# Patient Record
Sex: FEMALE | Race: WHITE | Employment: UNEMPLOYED | URBAN - METROPOLITAN AREA
[De-identification: names, ages, dates, MRNs, and addresses within clinical notes are randomized per-mention and may not be internally consistent; named-entity substitution may affect disease eponyms.]

---

## 2020-09-01 ENCOUNTER — THERAPY VISIT (OUTPATIENT)
Dept: PHYSICAL THERAPY | Facility: CLINIC | Age: 40
End: 2020-09-01
Payer: COMMERCIAL

## 2020-09-01 DIAGNOSIS — M25.552 HIP PAIN, LEFT: ICD-10-CM

## 2020-09-01 DIAGNOSIS — G89.29 CHRONIC PAIN OF RIGHT KNEE: ICD-10-CM

## 2020-09-01 DIAGNOSIS — M25.561 CHRONIC PAIN OF RIGHT KNEE: ICD-10-CM

## 2020-09-01 PROCEDURE — 97161 PT EVAL LOW COMPLEX 20 MIN: CPT | Mod: GP | Performed by: PHYSICAL THERAPIST

## 2020-09-01 PROCEDURE — 97110 THERAPEUTIC EXERCISES: CPT | Mod: GP | Performed by: PHYSICAL THERAPIST

## 2020-09-01 ASSESSMENT — ACTIVITIES OF DAILY LIVING (ADL)
WALK: ACTIVITY IS NOT DIFFICULT
HOS_ADL_COUNT: 16
WEAKNESS: THE SYMPTOM AFFECTS MY ACTIVITY SLIGHTLY
RISE FROM A CHAIR: ACTIVITY IS NOT DIFFICULT
HOS_ADL_ITEM_SCORE_TOTAL: 52
ROLLING_OVER_IN_BED: NO DIFFICULTY AT ALL
HEAVY_WORK: MODERATE DIFFICULTY
STIFFNESS: THE SYMPTOM AFFECTS MY ACTIVITY MODERATELY
WALKING_15_MINUTES_OR_GREATER: SLIGHT DIFFICULTY
DEEP_SQUATTING: EXTREME DIFFICULTY
HOS_ADL_SCORE(%): 81.25
HOW_WOULD_YOU_RATE_YOUR_CURRENT_LEVEL_OF_FUNCTION_DURING_YOUR_USUAL_ACTIVITIES_OF_DAILY_LIVING_FROM_0_TO_100_WITH_100_BEING_YOUR_LEVEL_OF_FUNCTION_PRIOR_TO_YOUR_HIP_PROBLEM_AND_0_BEING_THE_INABILITY_TO_PERFORM_ANY_OF_YOUR_USUAL_DAILY_ACTIVITIES?: 75
GETTING_INTO_AND_OUT_OF_AN_AVERAGE_CAR: NO DIFFICULTY AT ALL
STANDING_FOR_15_MINUTES: NO DIFFICULTY AT ALL
STEPPING_UP_AND_DOWN_CURBS: NO DIFFICULTY AT ALL
GIVING WAY, BUCKLING OR SHIFTING OF KNEE: I DO NOT HAVE THE SYMPTOM
GO UP STAIRS: ACTIVITY IS MINIMALLY DIFFICULT
GOING_DOWN_1_FLIGHT_OF_STAIRS: NO DIFFICULTY AT ALL
HOW_WOULD_YOU_RATE_THE_CURRENT_FUNCTION_OF_YOUR_KNEE_DURING_YOUR_USUAL_DAILY_ACTIVITIES_ON_A_SCALE_FROM_0_TO_100_WITH_100_BEING_YOUR_LEVEL_OF_KNEE_FUNCTION_PRIOR_TO_YOUR_INJURY_AND_0_BEING_THE_INABILITY_TO_PERFORM_ANY_OF_YOUR_USUAL_DAILY_ACTIVITIES?: 85
SQUAT: ACTIVITY IS SOMEWHAT DIFFICULT
WALKING_APPROXIMATELY_10_MINUTES: NO DIFFICULTY AT ALL
PUTTING_ON_SOCKS_AND_SHOES: SLIGHT DIFFICULTY
PAIN: THE SYMPTOM AFFECTS MY ACTIVITY MODERATELY
WALKING_DOWN_STEEP_HILLS: SLIGHT DIFFICULTY
KNEE_ACTIVITY_OF_DAILY_LIVING_SUM: 55
RECREATIONAL_ACTIVITIES: SLIGHT DIFFICULTY
GO DOWN STAIRS: ACTIVITY IS MINIMALLY DIFFICULT
SITTING_FOR_15_MINUTES: SLIGHT DIFFICULTY
GOING_UP_1_FLIGHT_OF_STAIRS: NO DIFFICULTY AT ALL
KNEEL ON THE FRONT OF YOUR KNEE: ACTIVITY IS MINIMALLY DIFFICULT
WALKING_INITIALLY: SLIGHT DIFFICULTY
SIT WITH YOUR KNEE BENT: ACTIVITY IS NOT DIFFICULT
TWISTING/PIVOTING_ON_INVOLVED_LEG: MODERATE DIFFICULTY
HOW_WOULD_YOU_RATE_THE_OVERALL_FUNCTION_OF_YOUR_KNEE_DURING_YOUR_USUAL_DAILY_ACTIVITIES?: NEARLY NORMAL
STAND: ACTIVITY IS NOT DIFFICULT
LIGHT_TO_MODERATE_WORK: NO DIFFICULTY AT ALL
SWELLING: I DO NOT HAVE THE SYMPTOM
RAW_SCORE: 55
WALKING_UP_STEEP_HILLS: SLIGHT DIFFICULTY
AS_A_RESULT_OF_YOUR_KNEE_INJURY,_HOW_WOULD_YOU_RATE_YOUR_CURRENT_LEVEL_OF_DAILY_ACTIVITY?: NORMAL
HOS_ADL_HIGHEST_POTENTIAL_SCORE: 64
LIMPING: THE SYMPTOM AFFECTS MY ACTIVITY SLIGHTLY
KNEE_ACTIVITY_OF_DAILY_LIVING_SCORE: 78.57

## 2020-09-01 NOTE — LETTER
LINDEN PATIÑO [facility] [place]  [address]  [address]  [place] [address]  [phone]    September 3, 2020    Re: Cassandra B Stransky   :   1980  MRN:  [id number]   REFERRING PHYSICIAN:   Jeannette ALTAMIRANOM [place] [facility] [place] [facility]    Date of Initial Evaluation: 20  Visits:  Rxs Used: 1  Reason for Referral:     Hip pain, left  Chronic pain of right knee    EVALUATION SUMMARY    [facility] Initial Evaluation  Subjective:  The history is provided by the patient. No  was used.   Therapist Generated HPI Evaluation  Problem details: Patient reports history of left hip and right knee pain beginning 8-9 months ago with no specific precipitating event at onset. She reports that she typically does stairs a lot at work and thought this may have caused the pain to increase, but has been off work since March and the pain hasn't improved since then.         Type of problem:  Left hip.    This is a chronic condition.  Condition occurred with:  Insidious onset.  Patient reports pain:  Groin, lateral and posterior.  Pain radiates to:  No radiation.   Associated symptoms:  Loss of motion/stiffness. Symptoms are exacerbated by walking, ascending stairs and descending stairs  and relieved by rest.    Therapist Generated HPI Evaluation         Type of problem:  Right knee.  This is a chronic condition.  Condition occurred with:  Insidious onset.  Patient reports pain:  Medial.    Associated symptoms:  Loss of strength. Symptoms are exacerbated by kneeling (getting up from kneeling)  and relieved by rest.  Patient Health History  Cassandra B Stransky being seen for left hip & right knee pain.     Date of Onset: 8-9 months ago.   Problem occurred: unknown cause   Pain is reported as 5/10 on pain scale.  Cassandra B Stransky   :   1980        General health as reported by patient is good.  Pertinent medical history includes: overweight and smoking.   Red flags:  None as reported  by patient.  Medical allergies: other. Other medical allergies details: Penicillin.   Surgeries include:  None.    Current medications:  None.    Current occupation is [employer].   Primary job tasks include:  Lifting/carrying, pushing/pulling, prolonged standing and repetitive tasks.                  Objective:  System       Hip Evaluation  HIP AROM:    Flexion: Left: 105    Right:  120  Abduction: Left:  38    Right:  40  Internal Rotation: Left: 30    Right: 45  External Rotation: Left: 45    Right: 45  Hip Strength:    Flexion:   Left: 4+/5   +  Pain:  Right: 5/5   -  Pain:              Extension:  Left: 4-/5  -  Pain:Right: 4+/5    -  Pain:    Abduction:  Left: 3+/5    ++   Pain:Right: 4+/5   -   Pain:       Knee Evaluation:  ROM:    AROM    Hyperextension:  Left:  2    Right: 5  Extension:  Left: 0    Right:  0  Flexion: Left: 150    Right: 150  Strength:   Extension:  Left: 5/5    Pain:-      Right: 5-/5    Pain:-  Flexion:  Left: 5/5    Pain:-      Right: 5/5    Pain:+/-        Assessment/Plan:    Patient is a 40 year old female with left side hip and right side knee complaints.    Patient has the following significant findings with corresponding treatment plan.                Diagnosis 1:  Left hip pain  Pain -  self management, education, directional preference exercise and home program  Decreased ROM/flexibility - manual therapy, therapeutic exercise and home program  Decreased strength - therapeutic exercise, therapeutic activities and home program  Impaired muscle performance - neuro re-education and home program  Decreased function - therapeutic activities and home program  Diagnosis 2:  Right knee pain   Pain -  self management, education, directional preference exercise and home program  Decreased strength - therapeutic exercise, therapeutic activities and home program  Cassandra B Stransky   :   1980          Impaired muscle performance - neuro re-education and home  program  Decreased function - therapeutic activities and home program    Therapy Evaluation Codes:     Cumulative Therapy Evaluation is: Low complexity.    Previous and current functional limitations:  (See Goal Flow Sheet for this information)    Short term and Long term goals: (See Goal Flow Sheet for this information)   Communication ability:  Patient appears to be able to clearly communicate and understand verbal and written communication and follow directions correctly.  Treatment Explanation - The following has been discussed with the patient:   RX ordered/plan of care  Anticipated outcomes  Possible risks and side effects  This patient would benefit from PT intervention to resume normal activities.   Rehab potential is good.    Frequency:  1 X week, once daily  Duration:  for 6 weeks  Discharge Plan:  Achieve all LTG.  Independent in home treatment program.  Reach maximal therapeutic benefit.          Thank you for your referral.    INQUIRIES  Therapist: Willie Campos DPT, Cert. MDT  [facility]LAKE [facility]  [address] [address]  [place] [address]  Phone: [phone]  Fax: [phone]

## 2020-09-01 NOTE — PROGRESS NOTES
[facility] Initial Evaluation  Subjective:  The history is provided by the patient. No  was used.   Therapist Generated HPI Evaluation  Problem details: Patient reports history of left hip and right knee pain beginning 8-9 months ago with no specific precipitating event at onset. She reports that she typically does stairs a lot at work and thought this may have caused the pain to increase, but has been off work since March and the pain hasn't improved since then.         Type of problem:  Left hip.    This is a chronic condition.  Condition occurred with:  Insidious onset.    Patient reports pain:  Groin, lateral and posterior.    Pain radiates to:  No radiation.     Associated symptoms:  Loss of motion/stiffness. Symptoms are exacerbated by walking, ascending stairs and descending stairs  and relieved by rest.          Therapist Generated HPI Evaluation         Type of problem:  Right knee.    This is a chronic condition.  Condition occurred with:  Insidious onset.    Patient reports pain:  Medial.        Associated symptoms:  Loss of strength. Symptoms are exacerbated by kneeling (getting up from kneeling)  and relieved by rest.          Patient Health History  Daily [facility]CE Madelinfrancois being seen for left hip & right knee pain.     Date of Onset: 8-9 months ago.   Problem occurred: unknown cause   Pain is reported as 5/10 on pain scale.  General health as reported by patient is good.  Pertinent medical history includes: overweight and smoking.   Red flags:  None as reported by patient.  Medical allergies: other. Other medical allergies details: Penicillin.   Surgeries include:  None.    Current medications:  None.    Current occupation is [employer].   Primary job tasks include:  Lifting/carrying, pushing/pulling, prolonged standing and repetitive tasks.                                    Objective:  System                                           Hip Evaluation  HIP  AROM:    Flexion: Left: 105    Right:  120      Abduction: Left:  38    Right:  40      Internal Rotation: Left: 30    Right: 45  External Rotation: Left: 45    Right: 45        Hip Strength:    Flexion:   Left: 4+/5   +  Pain:  Right: 5/5   -  Pain:                    Extension:  Left: 4-/5  -  Pain:Right: 4+/5    -  Pain:    Abduction:  Left: 3+/5    ++   Pain:Right: 4+/5   -   Pain:                           Knee Evaluation:  ROM:    AROM    Hyperextension:  Left:  2    Right: 5  Extension:  Left: 0    Right:  0  Flexion: Left: 150    Right: 150        Strength:     Extension:  Left: 5/5    Pain:-      Right: 5-/5    Pain:-  Flexion:  Left: 5/5    Pain:-      Right: 5/5    Pain:+/-                        General     ROS    Assessment/Plan:    Patient is a 40 year old female with left side hip and right side knee complaints.    Patient has the following significant findings with corresponding treatment plan.                Diagnosis 1:  Left hip pain  Pain -  self management, education, directional preference exercise and home program  Decreased ROM/flexibility - manual therapy, therapeutic exercise and home program  Decreased strength - therapeutic exercise, therapeutic activities and home program  Impaired muscle performance - neuro re-education and home program  Decreased function - therapeutic activities and home program  Diagnosis 2:  Right knee pain   Pain -  self management, education, directional preference exercise and home program  Decreased strength - therapeutic exercise, therapeutic activities and home program  Impaired muscle performance - neuro re-education and home program  Decreased function - therapeutic activities and home program    Therapy Evaluation Codes:     Cumulative Therapy Evaluation is: Low complexity.    Previous and current functional limitations:  (See Goal Flow Sheet for this information)    Short term and Long term goals: (See Goal Flow Sheet for this information)      Communication ability:  Patient appears to be able to clearly communicate and understand verbal and written communication and follow directions correctly.  Treatment Explanation - The following has been discussed with the patient:   RX ordered/plan of care  Anticipated outcomes  Possible risks and side effects  This patient would benefit from PT intervention to resume normal activities.   Rehab potential is good.    Frequency:  1 X week, once daily  Duration:  for 6 weeks  Discharge Plan:  Achieve all LTG.  Independent in home treatment program.  Reach maximal therapeutic benefit.    Please refer to the daily flowsheet for treatment today, total treatment time and time spent performing 1:1 timed codes.

## 2020-11-27 PROBLEM — M25.561 CHRONIC PAIN OF RIGHT KNEE: Status: RESOLVED | Noted: 2020-09-01 | Resolved: 2020-11-27

## 2020-11-27 PROBLEM — G89.29 CHRONIC PAIN OF RIGHT KNEE: Status: RESOLVED | Noted: 2020-09-01 | Resolved: 2020-11-27

## 2020-11-27 PROBLEM — M25.552 HIP PAIN, LEFT: Status: RESOLVED | Noted: 2020-09-01 | Resolved: 2020-11-27

## 2020-11-27 NOTE — PROGRESS NOTES
Patient did not return to PT following initial evaluation on 9/1/20. Please let evaluation information serve as discharge information.

## 2021-05-29 ENCOUNTER — RECORDS - HEALTHEAST (OUTPATIENT)
Dept: ADMINISTRATIVE | Facility: CLINIC | Age: 41
End: 2021-05-29

## 2022-06-15 ENCOUNTER — NURSE TRIAGE (OUTPATIENT)
Dept: NURSING | Facility: CLINIC | Age: 42
End: 2022-06-15
Payer: COMMERCIAL

## 2022-06-16 NOTE — TELEPHONE ENCOUNTER
Nurse Triage    Is this a 2nd Level Triage? NO    Situation: Patient calling with concerns for a headache.     Background: Pt reports she has had an ongoing headache since 6/11/2022. She has been taking Excedrin for the pain and this helps a little bit.     Assessment: Pt reports headache pain is rated at a 3/10 currently, but will get up to a 7/10 at times. She reports nausea with the headache and has had at least a couple of emesis over the past few days. Pt denies severe headache, confusion, any head injury, or feeling too weak to stand.     Protocol Recommended Disposition:   SEE PCP WITHIN 4 HOURS.   Recommendation: Advised patient to SEE PCP WITHIN 4 HOURS. Care advice given. Reviewed concerning symptoms and when to call back. Pt is normallyt seen by [facility], advised patient to reach out to the nurse triage line for that clinic or be seen by HCP within 4 hours. Pt states she will call nurse line for [facility].     Valencia Ahmadi RN [facility] 6/15/2022 9:43 PM    Additional Information    Negative: Difficult to awaken or acting confused (e.g., disoriented, slurred speech)    Negative: [1] Weakness of the face, arm or leg on one side of the body AND [2] new onset    Negative: [1] Numbness of the face, arm or leg on one side of the body AND [2] new onset    Negative: [1] Loss of speech or garbled speech AND [2] new onset    Negative: Passed out (i.e., lost consciousness, collapsed and was not responding)    Negative: Sounds like a life-threatening emergency to the triager    Negative: Followed a head injury    Negative: Pregnant    Negative: Postpartum (from 0 to 6 weeks after delivery)    Negative: Traumatic Brain Injury (TBI) is suspected    Negative: Unable to walk, or can only walk with assistance (e.g., requires support)    Negative: Stiff neck (can't touch chin to chest)    Negative: Severe pain in one eye    Negative: [1] Other family members (or roommates) with  "headaches AND [2] possibility of carbon monoxide exposure    Negative: [1] SEVERE headache (e.g., excruciating) AND [2] \"worst headache\" of life    Negative: [1] SEVERE headache AND [2] sudden-onset (i.e., reaching maximum intensity within seconds)    Negative: [1] SEVERE headache AND [2] fever    Negative: Loss of vision or double vision (Exception: same as prior migraines)    Negative: [1] Fever > 100.0 F (37.8 C) AND [2] diabetes mellitus or weak immune system (e.g., HIV positive, cancer chemo, splenectomy, organ transplant, chronic steroids)    Negative: Patient sounds very sick or weak to the triager    Negative: [1] SEVERE headache (e.g., excruciating) AND [2] not improved after 2 hours of pain medicine    [1] Vomiting AND [2] 2 or more times (Exception: similar to previous migraines)    Protocols used: HEADACHE-A-AH      "

## 2025-06-16 ENCOUNTER — LAB REQUISITION (OUTPATIENT)
Dept: LAB | Facility: CLINIC | Age: 45
End: 2025-06-16
Payer: COMMERCIAL

## 2025-06-16 DIAGNOSIS — M25.461 EFFUSION, RIGHT KNEE: ICD-10-CM

## 2025-06-16 LAB
APPEARANCE FLD: ABNORMAL
COLOR FLD: YELLOW
CRYSTALS SNV MICRO: NORMAL
LYMPHOCYTES NFR FLD MANUAL: 9 %
MONOS+MACROS NFR FLD MANUAL: 19 %
NEUTS BAND NFR FLD MANUAL: 72 %
SPECIMEN SOURCE FLD: ABNORMAL
WBC # FLD AUTO: 168 /UL

## 2025-06-16 PROCEDURE — 87075 CULTR BACTERIA EXCEPT BLOOD: CPT | Mod: ORL | Performed by: ORTHOPAEDIC SURGERY

## 2025-06-16 PROCEDURE — 87205 SMEAR GRAM STAIN: CPT | Mod: ORL | Performed by: ORTHOPAEDIC SURGERY

## 2025-06-16 PROCEDURE — 89051 BODY FLUID CELL COUNT: CPT | Mod: ORL | Performed by: ORTHOPAEDIC SURGERY

## 2025-06-16 PROCEDURE — 89060 EXAM SYNOVIAL FLUID CRYSTALS: CPT | Mod: ORL | Performed by: ORTHOPAEDIC SURGERY

## 2025-06-19 LAB — BACTERIA SNV CULT: NORMAL

## 2025-06-21 LAB
BACTERIA SNV CULT: NO GROWTH
GRAM STAIN RESULT: NORMAL
GRAM STAIN RESULT: NORMAL

## 2025-06-26 LAB — BACTERIA SNV CULT: NORMAL

## 2025-06-30 LAB — BACTERIA SNV CULT: NORMAL
